# Patient Record
Sex: FEMALE | Race: WHITE | NOT HISPANIC OR LATINO | Employment: FULL TIME | URBAN - METROPOLITAN AREA
[De-identification: names, ages, dates, MRNs, and addresses within clinical notes are randomized per-mention and may not be internally consistent; named-entity substitution may affect disease eponyms.]

---

## 2022-06-23 ENCOUNTER — HOSPITAL ENCOUNTER (EMERGENCY)
Facility: CLINIC | Age: 22
Discharge: HOME | End: 2022-06-23
Attending: EMERGENCY MEDICINE | Admitting: EMERGENCY MEDICINE
Payer: COMMERCIAL

## 2022-06-23 ENCOUNTER — APPOINTMENT (OUTPATIENT)
Dept: ULTRASOUND IMAGING | Facility: CLINIC | Age: 22
End: 2022-06-23
Attending: EMERGENCY MEDICINE
Payer: COMMERCIAL

## 2022-06-23 VITALS
SYSTOLIC BLOOD PRESSURE: 102 MMHG | HEART RATE: 66 BPM | RESPIRATION RATE: 16 BRPM | DIASTOLIC BLOOD PRESSURE: 56 MMHG | OXYGEN SATURATION: 100 % | TEMPERATURE: 98.2 F

## 2022-06-23 DIAGNOSIS — O46.90 VAGINAL BLEEDING IN PREGNANCY: ICD-10-CM

## 2022-06-23 LAB
ABO/RH(D): NORMAL
ANION GAP SERPL CALCULATED.3IONS-SCNC: 5 MMOL/L (ref 3–14)
ANTIBODY SCREEN: NEGATIVE
B-HCG SERPL-ACNC: 18 IU/L (ref 0–5)
BASOPHILS # BLD AUTO: 0.1 10E3/UL (ref 0–0.2)
BASOPHILS NFR BLD AUTO: 1 %
BUN SERPL-MCNC: 12 MG/DL (ref 7–30)
CALCIUM SERPL-MCNC: 9 MG/DL (ref 8.5–10.1)
CHLORIDE BLD-SCNC: 106 MMOL/L (ref 94–109)
CO2 SERPL-SCNC: 28 MMOL/L (ref 20–32)
CREAT SERPL-MCNC: 0.58 MG/DL (ref 0.52–1.04)
EOSINOPHIL # BLD AUTO: 0.2 10E3/UL (ref 0–0.7)
EOSINOPHIL NFR BLD AUTO: 2 %
ERYTHROCYTE [DISTWIDTH] IN BLOOD BY AUTOMATED COUNT: 12.6 % (ref 10–15)
GFR SERPL CREATININE-BSD FRML MDRD: >90 ML/MIN/1.73M2
GLUCOSE BLD-MCNC: 101 MG/DL (ref 70–99)
HCT VFR BLD AUTO: 38.9 % (ref 35–47)
HGB BLD-MCNC: 12.3 G/DL (ref 11.7–15.7)
IMM GRANULOCYTES # BLD: 0 10E3/UL
IMM GRANULOCYTES NFR BLD: 0 %
LYMPHOCYTES # BLD AUTO: 3 10E3/UL (ref 0.8–5.3)
LYMPHOCYTES NFR BLD AUTO: 33 %
MCH RBC QN AUTO: 27.6 PG (ref 26.5–33)
MCHC RBC AUTO-ENTMCNC: 31.6 G/DL (ref 31.5–36.5)
MCV RBC AUTO: 87 FL (ref 78–100)
MONOCYTES # BLD AUTO: 0.8 10E3/UL (ref 0–1.3)
MONOCYTES NFR BLD AUTO: 9 %
NEUTROPHILS # BLD AUTO: 4.9 10E3/UL (ref 1.6–8.3)
NEUTROPHILS NFR BLD AUTO: 55 %
NRBC # BLD AUTO: 0 10E3/UL
NRBC BLD AUTO-RTO: 0 /100
PLATELET # BLD AUTO: 347 10E3/UL (ref 150–450)
POTASSIUM BLD-SCNC: 3.6 MMOL/L (ref 3.4–5.3)
RBC # BLD AUTO: 4.46 10E6/UL (ref 3.8–5.2)
SODIUM SERPL-SCNC: 139 MMOL/L (ref 133–144)
SPECIMEN EXPIRATION DATE: NORMAL
WBC # BLD AUTO: 9 10E3/UL (ref 4–11)

## 2022-06-23 PROCEDURE — 250N000011 HC RX IP 250 OP 636: Performed by: EMERGENCY MEDICINE

## 2022-06-23 PROCEDURE — 86901 BLOOD TYPING SEROLOGIC RH(D): CPT | Performed by: EMERGENCY MEDICINE

## 2022-06-23 PROCEDURE — 96361 HYDRATE IV INFUSION ADD-ON: CPT

## 2022-06-23 PROCEDURE — 82374 ASSAY BLOOD CARBON DIOXIDE: CPT | Performed by: EMERGENCY MEDICINE

## 2022-06-23 PROCEDURE — 96374 THER/PROPH/DIAG INJ IV PUSH: CPT

## 2022-06-23 PROCEDURE — 258N000003 HC RX IP 258 OP 636: Performed by: EMERGENCY MEDICINE

## 2022-06-23 PROCEDURE — 82310 ASSAY OF CALCIUM: CPT | Performed by: EMERGENCY MEDICINE

## 2022-06-23 PROCEDURE — 36415 COLL VENOUS BLD VENIPUNCTURE: CPT | Performed by: EMERGENCY MEDICINE

## 2022-06-23 PROCEDURE — 84702 CHORIONIC GONADOTROPIN TEST: CPT | Performed by: EMERGENCY MEDICINE

## 2022-06-23 PROCEDURE — 99285 EMERGENCY DEPT VISIT HI MDM: CPT | Mod: 25

## 2022-06-23 PROCEDURE — 85025 COMPLETE CBC W/AUTO DIFF WBC: CPT | Performed by: EMERGENCY MEDICINE

## 2022-06-23 PROCEDURE — 76801 OB US < 14 WKS SINGLE FETUS: CPT

## 2022-06-23 PROCEDURE — 86850 RBC ANTIBODY SCREEN: CPT | Performed by: EMERGENCY MEDICINE

## 2022-06-23 RX ORDER — KETOROLAC TROMETHAMINE 15 MG/ML
15 INJECTION, SOLUTION INTRAMUSCULAR; INTRAVENOUS ONCE
Status: COMPLETED | OUTPATIENT
Start: 2022-06-23 | End: 2022-06-23

## 2022-06-23 RX ORDER — ONDANSETRON 4 MG/1
4 TABLET, ORALLY DISINTEGRATING ORAL ONCE
Status: COMPLETED | OUTPATIENT
Start: 2022-06-23 | End: 2022-06-23

## 2022-06-23 RX ADMIN — ONDANSETRON 4 MG: 4 TABLET, ORALLY DISINTEGRATING ORAL at 20:33

## 2022-06-23 RX ADMIN — KETOROLAC TROMETHAMINE 15 MG: 15 INJECTION, SOLUTION INTRAMUSCULAR; INTRAVENOUS at 22:10

## 2022-06-23 RX ADMIN — SODIUM CHLORIDE 1000 ML: 9 INJECTION, SOLUTION INTRAVENOUS at 22:10

## 2022-06-23 ASSESSMENT — ENCOUNTER SYMPTOMS
VOMITING: 0
NAUSEA: 1
ABDOMINAL PAIN: 1

## 2022-06-23 NOTE — LETTER
June 23, 2022      To Whom It May Concern:      Rosanne Raines was seen in our Emergency Department today, 06/23/22. She may return to work/school on 06/25/2022.    Sincerely,        Ryan Wright RN

## 2022-06-24 NOTE — DISCHARGE INSTRUCTIONS
Please follow-up with your primary OB/GYN provider for ongoing monitoring of your symptoms    Return to ER with increased pain, bleeding, vomiting, fevers, chills

## 2022-06-24 NOTE — ED PROVIDER NOTES
History   Chief Complaint:  Vaginal Bleeding - Pregnant     The history is provided by the patient.      Rosanne Raines is a pregnant 22 year old  at approximately 5 wk EGA, who presents with vaginal bleeding.  Patient was initially evaluated at the emergency room on 2022.  She follow-up with OB/GYN on 2022, or quantitative hCG returned at 113.  This was rechecked yesterday and declined to 37.  Patient reports that at her OB/GYN visit, her blood pressure was noted to be low.  Since that time, she notes ongoing vaginal bleeding.  Earlier today, she noted soaking through approximately one half of tampon per hour, prompting concern.  She also notes ongoing abdominal cramping.  Upon arrival, she feels as though her bleeding has subsided/slowed.  She denies any prior history of miscarriage.  Per chart review, she is Rh+.  Denies any other complaints or concerns at this time.    Review of Systems   Gastrointestinal: Positive for abdominal pain and nausea. Negative for vomiting.   Genitourinary: Positive for vaginal bleeding and vaginal pain.   All other systems reviewed and are negative.    Allergies:  The patient has no known allergies.     Medications:  The patient is currently on no regular medications.     Past Medical History:     The patient denies past medical history.      Social History:  The patient presents to the ED alone.  The patient arrived via private car.     Physical Exam     Patient Vitals for the past 24 hrs:   BP Temp Temp src Pulse Resp SpO2   22 2327 102/56 -- -- 66 16 100 %   22 2148 104/79 -- -- -- -- --   22 -- 98.2  F (36.8  C) Oral 74 20 97 %       Physical Exam  General:              Well-nourished              Speaking in full sentences  Eyes:              Conjunctiva without injection or scleral icterus  ENT:              Moist mucous membranes              Nares patent              Pinnae normal  Neck:              Full ROM              No stiffness  appreciated  Resp:              Lungs CTAB              No crackles, wheezing or audible rubs              Good air movement  CV:                    Normal rate, regular rhythm              S1 and S2 present              No murmur, gallop or rub  GI:              BS present              Abdomen soft without distention              Non-tender to light and deep palpation              No guarding or rebound tenderness  Skin:              Warm, dry, well perfused              No rashes or open wounds on exposed skin  MSK:              Moves all extremities              No focal deformities or swelling  Neuro:              Alert              Answers questions appropriately              Moves all extremities equally              Gait stable  Psych:              Normal affect, normal mood    Emergency Department Course   Imaging:  OB  US 1st trimester w transvag   Final Result   IMPRESSION:    1.  Anteverted uterus. No intrauterine or ectopic gestation identified. Normal endometrial stripe. Please correlate clinically.      2.  Both ovaries are obscured due to bowel gas.      3.  No free fluid in the dependent pelvis.              Report per radiology    Laboratory:  Labs Ordered and Resulted from Time of ED Arrival to Time of ED Departure   HCG QUANTITATIVE PREGNANCY - Abnormal       Result Value    hCG Quantitative 18 (*)    BASIC METABOLIC PANEL - Abnormal    Sodium 139      Potassium 3.6      Chloride 106      Carbon Dioxide (CO2) 28      Anion Gap 5      Urea Nitrogen 12      Creatinine 0.58      Calcium 9.0      Glucose 101 (*)     GFR Estimate >90     CBC WITH PLATELETS AND DIFFERENTIAL    WBC Count 9.0      RBC Count 4.46      Hemoglobin 12.3      Hematocrit 38.9      MCV 87      MCH 27.6      MCHC 31.6      RDW 12.6      Platelet Count 347      % Neutrophils 55      % Lymphocytes 33      % Monocytes 9      % Eosinophils 2      % Basophils 1      % Immature Granulocytes 0      NRBCs per 100 WBC 0      Absolute  Neutrophils 4.9      Absolute Lymphocytes 3.0      Absolute Monocytes 0.8      Absolute Eosinophils 0.2      Absolute Basophils 0.1      Absolute Immature Granulocytes 0.0      Absolute NRBCs 0.0     TYPE AND SCREEN, ADULT   ABO/RH TYPE AND SCREEN        Emergency Department Course:     Reviewed:  I reviewed nursing notes, vitals, past medical history and Care Everywhere    Assessments:   I obtained history and examined the patient as noted above.    I rechecked the patient and explained findings.     Consults:   I spoke with the ultrasound service from [facility] regarding patient's presentation, findings, and plan of care.    Interventions:   Zofran 4 mg Oral  0 0.9% sodium chloride BOLUS 1L IV.  Hemoglobin presently normal.  Offered pelvic exam, though given improvements in bleeding, patient declining at this time.   Toradol 15 mg IV    Disposition:  The patient was discharged to home.     Impression & Plan   Medical Decision Making:  Rosanne Raines is a 22 yr old  presenting to the ED for evaluation of vaginal bleeding in the setting of pregnancy.  VS on presentation reveal no acute abnormalities.  Her presenting evaluation is concerning for miscarriage given declining hCG.  Quantitative hCG returned at 18, which has progressively declined from 2 previous values.  Patient is Rh+ and thus does not require RhoGAM.  Pelvic ultrasound performed, which is negative for evidence of IUP, retained products of conception, or clear findings to suggest that ectopic pregnancy.  Specifically, no evidence of free intra-abdominal fluid.  Note made that ovaries are partially obscured by bowel gas, though current presentation also less suggestive of ovarian torsion.  Patient offered pelvic exam, though at this time, bleeding has improved and she declines.  I do feel this to be reasonable as it would unlikely  moving forward.  Discussed ongoing supportive cares including rest, fluids,  Tylenol as needed for analgesia, and follow-up with OB/GYN to ensure hCG level declines to 0.  Return precautions discussed including increased pain, bleeding more than 1 pad per hour, fevers, or any other new or troubling symptoms.  Remainder of abdominal exam is soft and nontender for which I feel concurrent surgical intra-abdominal pathology to be unlikely and further advanced imaging can be deferred.  Patient comfortable with outlined plan of care.  Questions have been answered prior to discharge.    Diagnosis:    ICD-10-CM    1. Vaginal bleeding in pregnancy  O46.90        Scribe Disclosure:  I, Vinicius Castrejon, am serving as a scribe at 9:20 PM on 6/23/2022 to document services personally performed by Randy Luevano MD based on my observations and the provider's statements to me.        Randy Luevano MD  06/24/22 0012

## 2022-12-23 ENCOUNTER — HOSPITAL ENCOUNTER (EMERGENCY)
Facility: CLINIC | Age: 22
Discharge: HOME | End: 2022-12-24
Attending: EMERGENCY MEDICINE | Admitting: EMERGENCY MEDICINE
Payer: COMMERCIAL

## 2022-12-23 DIAGNOSIS — L03.116 CELLULITIS OF LEFT LOWER EXTREMITY: ICD-10-CM

## 2022-12-23 DIAGNOSIS — R11.2 NAUSEA AND VOMITING, UNSPECIFIED VOMITING TYPE: ICD-10-CM

## 2022-12-23 LAB
ALBUMIN UR-MCNC: NEGATIVE MG/DL
ANION GAP SERPL CALCULATED.3IONS-SCNC: 10 MMOL/L (ref 7–15)
APPEARANCE UR: CLEAR
BILIRUB UR QL STRIP: NEGATIVE
BUN SERPL-MCNC: 11 MG/DL (ref 6–20)
CALCIUM SERPL-MCNC: 9.6 MG/DL (ref 8.6–10)
CHLORIDE SERPL-SCNC: 101 MMOL/L (ref 98–107)
COLOR UR AUTO: ABNORMAL
CREAT SERPL-MCNC: 0.6 MG/DL (ref 0.51–0.95)
DEPRECATED HCO3 PLAS-SCNC: 25 MMOL/L (ref 22–29)
ERYTHROCYTE [DISTWIDTH] IN BLOOD BY AUTOMATED COUNT: 12.5 % (ref 10–15)
FLUAV RNA SPEC QL NAA+PROBE: NEGATIVE
FLUBV RNA RESP QL NAA+PROBE: NEGATIVE
GFR SERPL CREATININE-BSD FRML MDRD: >90 ML/MIN/1.73M2
GLUCOSE SERPL-MCNC: 101 MG/DL (ref 70–99)
GLUCOSE UR STRIP-MCNC: NEGATIVE MG/DL
HCG SERPL QL: NEGATIVE
HCT VFR BLD AUTO: 39.9 % (ref 35–47)
HGB BLD-MCNC: 12.9 G/DL (ref 11.7–15.7)
HGB UR QL STRIP: ABNORMAL
HOLD SPECIMEN: NORMAL
KETONES UR STRIP-MCNC: NEGATIVE MG/DL
LACTATE SERPL-SCNC: 1 MMOL/L (ref 0.7–2)
LEUKOCYTE ESTERASE UR QL STRIP: NEGATIVE
MCH RBC QN AUTO: 27.8 PG (ref 26.5–33)
MCHC RBC AUTO-ENTMCNC: 32.3 G/DL (ref 31.5–36.5)
MCV RBC AUTO: 86 FL (ref 78–100)
MUCOUS THREADS #/AREA URNS LPF: PRESENT /LPF
NITRATE UR QL: NEGATIVE
PH UR STRIP: 6.5 [PH] (ref 5–7)
PLATELET # BLD AUTO: 334 10E3/UL (ref 150–450)
POTASSIUM SERPL-SCNC: 4.1 MMOL/L (ref 3.4–5.3)
RBC # BLD AUTO: 4.64 10E6/UL (ref 3.8–5.2)
RBC URINE: 4 /HPF
RSV RNA SPEC NAA+PROBE: NEGATIVE
SARS-COV-2 RNA RESP QL NAA+PROBE: NEGATIVE
SODIUM SERPL-SCNC: 136 MMOL/L (ref 136–145)
SP GR UR STRIP: 1.02 (ref 1–1.03)
SQUAMOUS EPITHELIAL: 2 /HPF
UROBILINOGEN UR STRIP-MCNC: NORMAL MG/DL
WBC # BLD AUTO: 6.4 10E3/UL (ref 4–11)
WBC URINE: 2 /HPF

## 2022-12-23 PROCEDURE — 85027 COMPLETE CBC AUTOMATED: CPT | Performed by: EMERGENCY MEDICINE

## 2022-12-23 PROCEDURE — 96361 HYDRATE IV INFUSION ADD-ON: CPT

## 2022-12-23 PROCEDURE — 96374 THER/PROPH/DIAG INJ IV PUSH: CPT

## 2022-12-23 PROCEDURE — 80048 BASIC METABOLIC PNL TOTAL CA: CPT | Performed by: EMERGENCY MEDICINE

## 2022-12-23 PROCEDURE — 84703 CHORIONIC GONADOTROPIN ASSAY: CPT | Performed by: EMERGENCY MEDICINE

## 2022-12-23 PROCEDURE — 36415 COLL VENOUS BLD VENIPUNCTURE: CPT | Performed by: EMERGENCY MEDICINE

## 2022-12-23 PROCEDURE — C9803 HOPD COVID-19 SPEC COLLECT: HCPCS

## 2022-12-23 PROCEDURE — 87637 SARSCOV2&INF A&B&RSV AMP PRB: CPT | Performed by: EMERGENCY MEDICINE

## 2022-12-23 PROCEDURE — 258N000003 HC RX IP 258 OP 636: Performed by: EMERGENCY MEDICINE

## 2022-12-23 PROCEDURE — 81001 URINALYSIS AUTO W/SCOPE: CPT | Performed by: EMERGENCY MEDICINE

## 2022-12-23 PROCEDURE — 83605 ASSAY OF LACTIC ACID: CPT | Performed by: EMERGENCY MEDICINE

## 2022-12-23 PROCEDURE — 96376 TX/PRO/DX INJ SAME DRUG ADON: CPT

## 2022-12-23 PROCEDURE — 250N000011 HC RX IP 250 OP 636: Performed by: EMERGENCY MEDICINE

## 2022-12-23 PROCEDURE — 99284 EMERGENCY DEPT VISIT MOD MDM: CPT | Mod: CS,25

## 2022-12-23 PROCEDURE — 87040 BLOOD CULTURE FOR BACTERIA: CPT | Performed by: EMERGENCY MEDICINE

## 2022-12-23 RX ORDER — ONDANSETRON 2 MG/ML
4 INJECTION INTRAMUSCULAR; INTRAVENOUS ONCE
Status: COMPLETED | OUTPATIENT
Start: 2022-12-23 | End: 2022-12-23

## 2022-12-23 RX ORDER — METOCLOPRAMIDE HYDROCHLORIDE 5 MG/ML
10 INJECTION INTRAMUSCULAR; INTRAVENOUS ONCE
Status: COMPLETED | OUTPATIENT
Start: 2022-12-24 | End: 2022-12-24

## 2022-12-23 RX ORDER — KETOROLAC TROMETHAMINE 15 MG/ML
10 INJECTION, SOLUTION INTRAMUSCULAR; INTRAVENOUS ONCE
Status: COMPLETED | OUTPATIENT
Start: 2022-12-24 | End: 2022-12-24

## 2022-12-23 RX ADMIN — SODIUM CHLORIDE 1000 ML: 9 INJECTION, SOLUTION INTRAVENOUS at 21:01

## 2022-12-23 RX ADMIN — ONDANSETRON 4 MG: 2 INJECTION INTRAMUSCULAR; INTRAVENOUS at 23:15

## 2022-12-23 RX ADMIN — ONDANSETRON 4 MG: 2 INJECTION INTRAMUSCULAR; INTRAVENOUS at 21:01

## 2022-12-23 ASSESSMENT — ACTIVITIES OF DAILY LIVING (ADL): ADLS_ACUITY_SCORE: 35

## 2022-12-24 VITALS
WEIGHT: 168 LBS | SYSTOLIC BLOOD PRESSURE: 109 MMHG | BODY MASS INDEX: 32.98 KG/M2 | DIASTOLIC BLOOD PRESSURE: 50 MMHG | TEMPERATURE: 98.2 F | OXYGEN SATURATION: 98 % | HEIGHT: 60 IN | RESPIRATION RATE: 16 BRPM | HEART RATE: 70 BPM

## 2022-12-24 PROCEDURE — 250N000011 HC RX IP 250 OP 636: Performed by: EMERGENCY MEDICINE

## 2022-12-24 PROCEDURE — 96375 TX/PRO/DX INJ NEW DRUG ADDON: CPT

## 2022-12-24 PROCEDURE — 258N000003 HC RX IP 258 OP 636: Performed by: EMERGENCY MEDICINE

## 2022-12-24 RX ORDER — METOCLOPRAMIDE 10 MG/1
10 TABLET ORAL 3 TIMES DAILY PRN
Qty: 20 TABLET | Refills: 0 | Status: SHIPPED | OUTPATIENT
Start: 2022-12-24

## 2022-12-24 RX ORDER — SODIUM CHLORIDE 9 MG/ML
INJECTION, SOLUTION INTRAVENOUS CONTINUOUS
Status: DISCONTINUED | OUTPATIENT
Start: 2022-12-24 | End: 2022-12-24 | Stop reason: HOSPADM

## 2022-12-24 RX ORDER — ONDANSETRON 4 MG/1
4 TABLET, ORALLY DISINTEGRATING ORAL EVERY 8 HOURS PRN
Qty: 10 TABLET | Refills: 0 | Status: SHIPPED | OUTPATIENT
Start: 2022-12-24 | End: 2022-12-27

## 2022-12-24 RX ORDER — CEPHALEXIN 500 MG/1
500 CAPSULE ORAL 4 TIMES DAILY
Qty: 28 CAPSULE | Refills: 0 | Status: SHIPPED | OUTPATIENT
Start: 2022-12-24 | End: 2022-12-31

## 2022-12-24 RX ADMIN — SODIUM CHLORIDE 1000 ML: 9 INJECTION, SOLUTION INTRAVENOUS at 00:38

## 2022-12-24 RX ADMIN — KETOROLAC TROMETHAMINE 10 MG: 15 INJECTION, SOLUTION INTRAMUSCULAR; INTRAVENOUS at 00:37

## 2022-12-24 RX ADMIN — METOCLOPRAMIDE HYDROCHLORIDE 10 MG: 5 INJECTION INTRAMUSCULAR; INTRAVENOUS at 00:37

## 2022-12-24 NOTE — DISCHARGE INSTRUCTIONS
Please keep hydrated with lots of fluids.  May use zofran and or reglan as needed for nausea and vomiting.  If you are unable to keep fluids down, unable to urinate, lightheaded or dizzy, develop abdominal pain, fevers not responsive to tylenol/ibuprofen or other acute changes return to the ED.  Otherwise please follow up with your PCP in 2-3 days for a recheck.      Discharge Instructions  Vomiting    You have been seen today for vomiting (throwing up). This is usually caused by a virus, but some bacteria, parasites, medicines or other medical conditions can cause similar symptoms. At this time your provider does not find that your vomiting is a sign of anything dangerous or life-threatening. However, sometimes the signs of serious illness do not show up right away. If you have new or worse symptoms, you may need to be seen again in the Emergency Department or by your primary provider. Remember that serious problems like appendicitis can start as vomiting.    Generally, every Emergency Department visit should have a follow-up clinic visit with either a primary or a specialty clinic/provider. Please follow-up as instructed by your emergency provider today.    Return to the Emergency Department if:  You keep vomiting and you are not able to keep liquids down.   You feel you are getting dehydrated, such as being very thirsty, not urinating (peeing) at least every 8-12 hours, or feeling faint or lightheaded.   You develop a new fever, or your fever continues for more than 2 days.   You have abdominal (belly pain) that seems worse than cramps, is in one spot, or is getting worse over time. Appendicitis usually causes pain in the right lower abdomen (to the right and below your belly button) so watch for pain in this location.  You have blood in your vomit or stools.   You feel very weak.  You are not starting to improve within 24 hours of your visit here.     What can I do to help myself?  The most important thing to do  is to drink clear liquids. If you have been vomiting a lot, it is best to have only small, frequent sips of liquids. Drinking too much at once may cause more vomiting. If you are vomiting often, you must replace minerals, sodium and potassium lost with your illness. Pedialyte  is the best available rehydration liquid but some find that it doesn t taste good so sports drinks are an alterative. You can also drink clear liquids such as water, weak tea, apple juice, and 7-Up . Avoid acid liquids (orange), caffeine (coffee) or alcohol. Do not drink milk until you no longer have diarrhea (loose stools).   After liquids are staying down, you may start eating mild foods. Soda crackers, toast, plain noodles, gelatin, applesauce and bananas are good first choices. Avoid foods that have acid, are spicy, fatty or have a lot of fiber (such as meats, coarse grains, vegetables). You may start eating these foods again in about 3 days when you are better.   Sometimes treatment includes prescription medicine to prevent nausea (sick to your stomach) and vomiting. If your provider prescribes these for you, take them as directed.   Do not take ibuprofen, naproxen, or other nonsteroidal anti-inflammatory (NSAID) medicines without checking with your healthcare provider.     If you were given a prescription for medicine here today, be sure to read all of the information (including the package insert) that comes with your prescription.  This will include important information about the medicine, its side effects, and any warnings that you need to know about.  The pharmacist who fills the prescription can provide more information and answer questions you may have about the medicine.  If you have questions or concerns that the pharmacist cannot address, please call or return to the Emergency Department.     Remember that you can always come back to the Emergency Department if you are not able to see your regular provider in the amount of time  listed above, if you get any new symptoms, or if there is anything that worries you.    Discharge Instructions  Cellulitis    Cellulitis is an infection of the skin that occurs when bacteria enter the skin.   Symptoms are generally redness, swelling, warmth and pain.  Your infection appeared to be appropriate to treat at home with antibiotics.  However, sometimes your infection may be worse than it seemed at first, or may worsen with time. If you have new or worse symptoms, you may need to be seen again in the Emergency Department or by your primary provider.    Generally, every Emergency Department visit should have a follow-up clinic visit with either a primary or a specialty clinic/provider. Please follow-up as instructed by your emergency provider today.    Return to the Emergency Department if:  The redness, pain, or swelling gets a lot worse.  If the red area was marked, return if it is red significantly beyond the marked area.  You are unable to get your antibiotics, or are vomiting (throwing up) these pills, or you cannot take them.  You are feeling more ill, weak or lightheaded.  You start to run a new fever (temperature >101 F).  Anything else about the infection worries or concerns you.  Treatment:  Start your antibiotics right away, and take them as prescribed. Be sure to finish the whole prescription, even if you are better.  Apply a heating pad, warm packs, or warm water soaks to the infected area for 15 minutes at a time, at least 3 times a day. Do not use a heating pad on your feet or legs if you have diabetes. Do not sleep with a heating pad on, since this can cause burns or skin injury.  Rest your injured area for at least 1-2 days. After that you may start using your extremity again as long as there is not too much pain.   Raise the injured area above the level of your heart as much as possible in the first 1-2 days.  Tylenol  (acetaminophen), Motrin  (ibuprofen), or Advil  (ibuprofen) may help  may help reduce pain and fever and may help you feel more comfortable. Be sure to read and follow the package directions, and ask your provider if you have questions.    If you were given a prescription for medicine here today, be sure to read all of the information (including the package insert) that comes with your prescription.  This will include important information about the medicine, its side effects, and any warnings that you need to know about.  The pharmacist who fills the prescription can provide more information and answer questions you may have about the medicine.  If you have questions or concerns that the pharmacist cannot address, please call or return to the Emergency Department.     Remember that you can always come back to the Emergency Department if you are not able to see your regular provider in the amount of time listed above, if you get any new symptoms, or if there is anything that worries you.

## 2022-12-24 NOTE — ED PROVIDER NOTES
History     Chief Complaint:  Fever       HPI   Rosanne Raines is a 22 year old female who presents with fever, aches, left foot pain.  Patient reports that she started to feel sick and was seen at the [place] ER on Tuesday.  She was given IV fluids and Rocephin.  She has had difficulty with nausea and vomiting since then.  They gave her the swallow Zofran not the ODT which she is found hard to tolerate.  She does not have any diarrhea, dysuria, frequency, abdominal pain, cough, runny nose, sore throat.  She notes that she started having the wound on the medial aspect of her left foot which started off as a blister but became more red, irritated and burning.  She returns the ER as she is having difficulty tolerating p.o.  She has not been able to tolerate her antibiotics.  She also continues have fevers.  Denies street drug use.      CXR 12/20/22 IMPRESSION:   The lungs are clear. No focal consolidation. No pneumothorax or pleural effusion. Normal   cardiac size.      ROS:  Review of Systems   All other systems reviewed and are negative.    Allergies:  No Known Allergies     Medications:    cephALEXin (KEFLEX) 500 MG capsule  metoclopramide (REGLAN) 10 MG tablet  ondansetron (ZOFRAN ODT) 4 MG ODT tab        Past Medical History:    No past medical history on file.    Past Surgical History:    No past surgical history on file.     Family History:    family history is not on file.    Social History:     PCP: No Ref-Primary, Physician     Physical Exam     Patient Vitals for the past 24 hrs:   BP Temp Temp src Pulse Resp SpO2 Height Weight   12/24/22 0045 109/50 -- -- 70 16 98 % -- --   12/23/22 2056 133/85 98.2  F (36.8  C) Oral 100 18 98 % 1.524 m (5') 76.2 kg (168 lb)        Physical Exam  General: Resting on the bed.  Head: No obvious trauma to head.  Ears, Nose, Throat:  External ears normal.  Nose normal.  No pharyngeal erythema, swelling or exudate.  Midline uvula.    Eyes:  Conjunctivae clear.  Pupils are  equal, round, and reactive.   Neck: Normal range of motion.  Neck supple.   CV: Regular rate and rhythm.  No murmurs.      Respiratory: Effort normal and breath sounds normal.  No wheezing or crackles.   Gastrointestinal: Soft.  No distension. There is no tenderness.    Musculoskeletal: Able to wiggle toes.  Sensation intact light touch.  Cap refill normal.  Normal range of motion of the MTP.  Neuro: Alert. Moving all extremities appropriately.  Normal speech.    Skin: Skin is warm and dry.  Erythema and tenderness noted on the medial aspect of the great toe MTP.  Emergency Department Course     Laboratory:  Labs Ordered and Resulted from Time of ED Arrival to Time of ED Departure   BASIC METABOLIC PANEL - Abnormal       Result Value    Sodium 136      Potassium 4.1      Chloride 101      Carbon Dioxide (CO2) 25      Anion Gap 10      Urea Nitrogen 11.0      Creatinine 0.60      Calcium 9.6      Glucose 101 (*)     GFR Estimate >90     ROUTINE UA WITH MICROSCOPIC REFLEX TO CULTURE - Abnormal    Color Urine Light Yellow      Appearance Urine Clear      Glucose Urine Negative      Bilirubin Urine Negative      Ketones Urine Negative      Specific Gravity Urine 1.021      Blood Urine Trace (*)     pH Urine 6.5      Protein Albumin Urine Negative      Urobilinogen Urine Normal      Nitrite Urine Negative      Leukocyte Esterase Urine Negative      Mucus Urine Present (*)     RBC Urine 4 (*)     WBC Urine 2      Squamous Epithelials Urine 2 (*)    CBC WITH PLATELETS - Normal    WBC Count 6.4      RBC Count 4.64      Hemoglobin 12.9      Hematocrit 39.9      MCV 86      MCH 27.8      MCHC 32.3      RDW 12.5      Platelet Count 334     HCG QUALITATIVE PREGNANCY - Normal    hCG Serum Qualitative Negative     LACTIC ACID WHOLE BLOOD - Normal    Lactic Acid 1.0     INFLUENZA A/B & SARS-COV2 PCR MULTIPLEX - Normal    Influenza A PCR Negative      Influenza B PCR Negative      RSV PCR Negative      SARS CoV2 PCR Negative      BLOOD CULTURE   BLOOD CULTURE        Procedures       Emergency Department Course:             Reviewed:  I reviewed nursing notes, vitals, past medical history and Care Everywhere    Assessments:   I obtained history and examined the patient as noted above.    I rechecked the patient and explained findings.   100 I rechecked the patient, tolerating PO     Interventions:  Medications   0.9% sodium chloride BOLUS (0 mLs Intravenous Stopped 22)     Followed by   sodium chloride 0.9% infusion (has no administration in time range)   ondansetron (ZOFRAN) injection 4 mg (4 mg Intravenous Given 22)   0.9% sodium chloride BOLUS (0 mLs Intravenous Stopped 22)   ondansetron (ZOFRAN) injection 4 mg (4 mg Intravenous Given 22)   metoclopramide (REGLAN) injection 10 mg (10 mg Intravenous Given 22)   ketorolac (TORADOL) injection 10 mg (10 mg Intravenous Given 22)        Disposition:  The patient was discharged to home.     Impression & Plan      Medical Decision Makin-year-old female otherwise healthy presents with a concern for wound infection.  Vitals are reassuring.  Broad differentials pursued include not limited to severe sepsis, septic shock, dehydration, electrolyte, metabolic, renal dysfunction, influenza, COVID-19, dehydration, obstruction, perforation, UTI, etc.  Patient is well-appearing nontoxic.  CBC without leukocytosis or anemia.  BMP without acute electrolyte, metabolic or renal dysfunction.  Lactate is normal not concerning for severe sepsis or septic shock.  COVID-19, influenza, RSV all negative.  Pregnancy test negative.  UA unrevealing no signs of obvious infection.  She recent chest x-ray that was negative for any infection.  She is a very mild cellulitis on the medial aspect of her left foot.  She is able to range the toe normally.  I do not suspect septic joint.  No obvious abscess or area requiring imaging at this time.   Blood cultures obtained and pending prior to my assessment.  Patient has a benign abdomen.  No evidence of acute surgical process.  Do not think imaging is appropriate.  She is given Zofran and Compazine along with fluids.  She was feeling better.  She was able to p.o. challenge.  On reassessment she had gotten a phone call that her child was also vomiting at home.  I suspect likely a stomach bug and possible cellulitis as well.  Recommend supportive care.  Close follow-up with primary doctor.  Return precautions were given and patient was wishing to go home feeling improved.    Diagnosis:    ICD-10-CM    1. Cellulitis of left lower extremity  L03.116       2. Nausea and vomiting, unspecified vomiting type  R11.2            Discharge Medications:  Discharge Medication List as of 12/24/2022  1:01 AM      START taking these medications    Details   cephALEXin (KEFLEX) 500 MG capsule Take 1 capsule (500 mg) by mouth 4 times daily for 7 days, Disp-28 capsule, R-0, E-Prescribe      metoclopramide (REGLAN) 10 MG tablet Take 1 tablet (10 mg) by mouth 3 times daily as needed (Nausea or Vomiting), Disp-20 tablet, R-0, E-Prescribe      ondansetron (ZOFRAN ODT) 4 MG ODT tab Take 1 tablet (4 mg) by mouth every 8 hours as needed for nausea, Disp-10 tablet, R-0, E-Prescribe              12/23/2022   Jessica Bennett MD Bennett, Jennifer L, MD  12/24/22 0114

## 2022-12-24 NOTE — ED TRIAGE NOTES
In [facility] for left foot cellulitis. Sent home on bactrim BID. Now pt having entire body aches, continued vomiting (unable to water and abx down). Redness outlined in triage. Temp 101.3 1hr PTA - 500mg tylenol taken at that time.

## 2022-12-24 NOTE — ED NOTES
"Regarding pt's PO challenge with water, pt states \"I have to force myself to take sips. I feel like I'm going to throw up.\" MD notified.  "

## 2022-12-29 LAB
BACTERIA BLD CULT: NO GROWTH
BACTERIA BLD CULT: NO GROWTH

## 2023-02-12 ENCOUNTER — HEALTH MAINTENANCE LETTER (OUTPATIENT)
Age: 23
End: 2023-02-12

## 2023-04-08 ENCOUNTER — APPOINTMENT (OUTPATIENT)
Dept: CT IMAGING | Facility: CLINIC | Age: 23
End: 2023-04-08
Attending: EMERGENCY MEDICINE
Payer: COMMERCIAL

## 2023-04-08 ENCOUNTER — HOSPITAL ENCOUNTER (EMERGENCY)
Facility: CLINIC | Age: 23
Discharge: HOME | End: 2023-04-09
Attending: EMERGENCY MEDICINE | Admitting: EMERGENCY MEDICINE
Payer: COMMERCIAL

## 2023-04-08 DIAGNOSIS — L03.818 CELLULITIS OF OTHER SPECIFIED SITE: ICD-10-CM

## 2023-04-08 DIAGNOSIS — R60.0 FACIAL EDEMA: ICD-10-CM

## 2023-04-08 LAB
ANION GAP SERPL CALCULATED.3IONS-SCNC: 10 MMOL/L (ref 7–15)
BASOPHILS # BLD AUTO: 0 10E3/UL (ref 0–0.2)
BASOPHILS NFR BLD AUTO: 0 %
BUN SERPL-MCNC: 7.6 MG/DL (ref 6–20)
CALCIUM SERPL-MCNC: 9.2 MG/DL (ref 8.6–10)
CHLORIDE SERPL-SCNC: 102 MMOL/L (ref 98–107)
CREAT SERPL-MCNC: 0.62 MG/DL (ref 0.51–0.95)
DEPRECATED HCO3 PLAS-SCNC: 27 MMOL/L (ref 22–29)
EOSINOPHIL # BLD AUTO: 0.1 10E3/UL (ref 0–0.7)
EOSINOPHIL NFR BLD AUTO: 1 %
ERYTHROCYTE [DISTWIDTH] IN BLOOD BY AUTOMATED COUNT: 12.5 % (ref 10–15)
GFR SERPL CREATININE-BSD FRML MDRD: >90 ML/MIN/1.73M2
GLUCOSE SERPL-MCNC: 98 MG/DL (ref 70–99)
HCG INTACT+B SERPL-ACNC: <1 MIU/ML
HCT VFR BLD AUTO: 37.1 % (ref 35–47)
HGB BLD-MCNC: 12.2 G/DL (ref 11.7–15.7)
IMM GRANULOCYTES # BLD: 0 10E3/UL
IMM GRANULOCYTES NFR BLD: 0 %
LACTATE SERPL-SCNC: 1.2 MMOL/L (ref 0.7–2)
LYMPHOCYTES # BLD AUTO: 2.8 10E3/UL (ref 0.8–5.3)
LYMPHOCYTES NFR BLD AUTO: 30 %
MCH RBC QN AUTO: 27.9 PG (ref 26.5–33)
MCHC RBC AUTO-ENTMCNC: 32.9 G/DL (ref 31.5–36.5)
MCV RBC AUTO: 85 FL (ref 78–100)
MONOCYTES # BLD AUTO: 0.9 10E3/UL (ref 0–1.3)
MONOCYTES NFR BLD AUTO: 10 %
NEUTROPHILS # BLD AUTO: 5.6 10E3/UL (ref 1.6–8.3)
NEUTROPHILS NFR BLD AUTO: 59 %
NRBC # BLD AUTO: 0 10E3/UL
NRBC BLD AUTO-RTO: 0 /100
PLATELET # BLD AUTO: 378 10E3/UL (ref 150–450)
POTASSIUM SERPL-SCNC: 4 MMOL/L (ref 3.4–5.3)
RBC # BLD AUTO: 4.38 10E6/UL (ref 3.8–5.2)
SODIUM SERPL-SCNC: 139 MMOL/L (ref 136–145)
WBC # BLD AUTO: 9.4 10E3/UL (ref 4–11)

## 2023-04-08 PROCEDURE — 96376 TX/PRO/DX INJ SAME DRUG ADON: CPT | Mod: 59

## 2023-04-08 PROCEDURE — 85004 AUTOMATED DIFF WBC COUNT: CPT | Performed by: EMERGENCY MEDICINE

## 2023-04-08 PROCEDURE — 99285 EMERGENCY DEPT VISIT HI MDM: CPT | Mod: 25

## 2023-04-08 PROCEDURE — 70487 CT MAXILLOFACIAL W/DYE: CPT

## 2023-04-08 PROCEDURE — 84702 CHORIONIC GONADOTROPIN TEST: CPT | Performed by: EMERGENCY MEDICINE

## 2023-04-08 PROCEDURE — 83605 ASSAY OF LACTIC ACID: CPT | Performed by: EMERGENCY MEDICINE

## 2023-04-08 PROCEDURE — 250N000013 HC RX MED GY IP 250 OP 250 PS 637: Performed by: EMERGENCY MEDICINE

## 2023-04-08 PROCEDURE — 250N000009 HC RX 250: Performed by: EMERGENCY MEDICINE

## 2023-04-08 PROCEDURE — 80048 BASIC METABOLIC PNL TOTAL CA: CPT | Performed by: EMERGENCY MEDICINE

## 2023-04-08 PROCEDURE — 250N000011 HC RX IP 250 OP 636: Performed by: EMERGENCY MEDICINE

## 2023-04-08 PROCEDURE — 36415 COLL VENOUS BLD VENIPUNCTURE: CPT | Performed by: EMERGENCY MEDICINE

## 2023-04-08 PROCEDURE — 96375 TX/PRO/DX INJ NEW DRUG ADDON: CPT

## 2023-04-08 PROCEDURE — 96365 THER/PROPH/DIAG IV INF INIT: CPT

## 2023-04-08 RX ORDER — MORPHINE SULFATE 4 MG/ML
4 INJECTION, SOLUTION INTRAMUSCULAR; INTRAVENOUS ONCE
Status: COMPLETED | OUTPATIENT
Start: 2023-04-08 | End: 2023-04-08

## 2023-04-08 RX ORDER — ONDANSETRON 2 MG/ML
4 INJECTION INTRAMUSCULAR; INTRAVENOUS ONCE
Status: COMPLETED | OUTPATIENT
Start: 2023-04-08 | End: 2023-04-08

## 2023-04-08 RX ORDER — IOPAMIDOL 755 MG/ML
75 INJECTION, SOLUTION INTRAVASCULAR ONCE
Status: COMPLETED | OUTPATIENT
Start: 2023-04-08 | End: 2023-04-08

## 2023-04-08 RX ORDER — ACETAMINOPHEN 500 MG
1000 TABLET ORAL ONCE
Status: COMPLETED | OUTPATIENT
Start: 2023-04-08 | End: 2023-04-08

## 2023-04-08 RX ORDER — ONDANSETRON 4 MG/1
4 TABLET, ORALLY DISINTEGRATING ORAL EVERY 8 HOURS PRN
Qty: 10 TABLET | Refills: 0 | Status: SHIPPED | OUTPATIENT
Start: 2023-04-08 | End: 2023-04-11

## 2023-04-08 RX ORDER — KETOROLAC TROMETHAMINE 15 MG/ML
15 INJECTION, SOLUTION INTRAMUSCULAR; INTRAVENOUS ONCE
Status: COMPLETED | OUTPATIENT
Start: 2023-04-08 | End: 2023-04-08

## 2023-04-08 RX ORDER — CEFTRIAXONE 2 G/1
2 INJECTION, POWDER, FOR SOLUTION INTRAMUSCULAR; INTRAVENOUS ONCE
Status: COMPLETED | OUTPATIENT
Start: 2023-04-08 | End: 2023-04-09

## 2023-04-08 RX ADMIN — MORPHINE SULFATE 4 MG: 4 INJECTION, SOLUTION INTRAMUSCULAR; INTRAVENOUS at 21:17

## 2023-04-08 RX ADMIN — PROCHLORPERAZINE EDISYLATE 5 MG: 5 INJECTION INTRAMUSCULAR; INTRAVENOUS at 23:56

## 2023-04-08 RX ADMIN — IOPAMIDOL 75 ML: 755 INJECTION, SOLUTION INTRAVENOUS at 22:14

## 2023-04-08 RX ADMIN — ONDANSETRON 4 MG: 2 INJECTION INTRAMUSCULAR; INTRAVENOUS at 20:41

## 2023-04-08 RX ADMIN — KETOROLAC TROMETHAMINE 15 MG: 15 INJECTION, SOLUTION INTRAMUSCULAR; INTRAVENOUS at 20:42

## 2023-04-08 RX ADMIN — CEFTRIAXONE SODIUM 2 G: 2 INJECTION, POWDER, FOR SOLUTION INTRAMUSCULAR; INTRAVENOUS at 23:50

## 2023-04-08 RX ADMIN — ONDANSETRON 4 MG: 2 INJECTION INTRAMUSCULAR; INTRAVENOUS at 22:21

## 2023-04-08 RX ADMIN — SODIUM CHLORIDE 60 ML: 9 INJECTION, SOLUTION INTRAVENOUS at 22:15

## 2023-04-08 ASSESSMENT — ACTIVITIES OF DAILY LIVING (ADL)
ADLS_ACUITY_SCORE: 35
ADLS_ACUITY_SCORE: 35

## 2023-04-09 VITALS
HEART RATE: 85 BPM | RESPIRATION RATE: 18 BRPM | BODY MASS INDEX: 33.38 KG/M2 | HEIGHT: 60 IN | DIASTOLIC BLOOD PRESSURE: 56 MMHG | TEMPERATURE: 98.7 F | WEIGHT: 170 LBS | OXYGEN SATURATION: 99 % | SYSTOLIC BLOOD PRESSURE: 101 MMHG

## 2023-04-09 RX ORDER — PROCHLORPERAZINE MALEATE 10 MG
10 TABLET ORAL EVERY 6 HOURS PRN
Qty: 12 TABLET | Refills: 0 | Status: SHIPPED | OUTPATIENT
Start: 2023-04-09

## 2023-04-09 NOTE — ED PROVIDER NOTES
History     Chief Complaint:  Wound Infection (Pt being treated for a facial abscess for last 4 days and pain and swelling getting worse and having fevers.)       BESSIE Raines is a 23 year old female otherwise healthy presenting to the emergency room with left cheek swelling.  The patient went to urgent care on 4/7 for left cheek swelling.  It him going on for 2 days and she had just seen her dentist.  The dentist said that she had cavities on that side but her teeth were not sensitive to chew or to temperature.  The dentist did place her on amoxicillin.  At urgent care they changed her over to Augmentin.  She started vomiting today and she states her pain is no worse the Toradol that they gave her helped a little bit but briefly.  She states she has had a fever today.  Otherwise no cough urinary symptoms..      Independent Historian:   None - Patient Only    Review of External Notes:   4/7/23 Dr Dudley [facility] patient had been seen that day for 2 days of left-sided cheek swelling.  She is seen her dentist which shoulder she has cavities and placed on amoxicillin.  The patient felt that her swelling was getting worse thus presented to urgent care they started her on max Augmentin.    ROS:  Review of Systems    Allergies:  No Known Allergies     Medications:    ondansetron (ZOFRAN ODT) 4 MG ODT tab  metoclopramide (REGLAN) 10 MG tablet        Past Medical History:    No past medical history on file.    Past Surgical History:    No past surgical history on file.     Family History:    family history is not on file.    Social History:     PCP: No Ref-Primary, Physician     Physical Exam     Patient Vitals for the past 24 hrs:   BP Temp Temp src Pulse Resp SpO2 Height Weight   04/08/23 2131 -- -- -- -- -- 97 % -- --   04/08/23 2117 -- -- -- -- -- 98 % -- --   04/08/23 1958 123/61 -- -- -- -- -- -- --   04/08/23 1957 (!) 123/1 98.7  F (37.1  C) Oral 85 18 98 % -- --   04/08/23 1956 -- -- -- -- -- --  1.524 m (5') 77.1 kg (170 lb)        Physical Exam    Physical Exam   Constitutional:  Patient is oriented to person, place, and time. They appear well-developed and well-nourished. Mild distress secondary to left cheek inflammation  HENT:   Mouth/Throat:   Oropharynx is clear and moist.  She does have some dental decay on the left upper lateral incisors.  She has a small soft tissue swelling above this tooth.  No purulence was expressed. Patient's left cheek over this area has a firmness to palpation and is tender.  There is no evidence of facial cellulitis.  Dentition appears to be normal.  No bogginess under the chin or submandibular swelling.  Eyes:    Conjunctivae normal and EOM are normal. Pupils are equal, round, and reactive to light.   Neck:    Normal range of motion.   Cardiovascular: Normal rate, regular rhythm and normal heart sounds.  Exam reveals no gallop and no friction rub.  No murmur heard.  Pulmonary/Chest:  Effort normal and breath sounds normal. Patient has no wheezes. Patient has no rales. rding.   Musculoskeletal:  Normal range of motion. Patient exhibits no edema.   Neurological:   Patient is alert and oriented to person, place, and time. Patient has normal strength. No cranial nerve deficit or sensory deficit. GCS 15  Skin:   Skin is warm and dry. No rash noted. No erythema.   Psychiatric:   Patient has a normal mood and affect. Patient's behavior is normal. Judgment and thought content normal.         Emergency Department Course     Imaging:  CT Facial Bones with Contrast   Final Result   IMPRESSION:    1.  Mild subcutaneous soft tissue swelling left maxillary region without abscess.         Report per radiology    Laboratory:  Labs Ordered and Resulted from Time of ED Arrival to Time of ED Departure   BASIC METABOLIC PANEL - Normal       Result Value    Sodium 139      Potassium 4.0      Chloride 102      Carbon Dioxide (CO2) 27      Anion Gap 10      Urea Nitrogen 7.6      Creatinine 0.62       Calcium 9.2      Glucose 98      GFR Estimate >90     LACTIC ACID WHOLE BLOOD - Normal    Lactic Acid 1.2     HCG QUANTITATIVE PREGNANCY - Normal    hCG Quantitative <1     CBC WITH PLATELETS AND DIFFERENTIAL    WBC Count 9.4      RBC Count 4.38      Hemoglobin 12.2      Hematocrit 37.1      MCV 85      MCH 27.9      MCHC 32.9      RDW 12.5      Platelet Count 378      % Neutrophils 59      % Lymphocytes 30      % Monocytes 10      % Eosinophils 1      % Basophils 0      % Immature Granulocytes 0      NRBCs per 100 WBC 0      Absolute Neutrophils 5.6      Absolute Lymphocytes 2.8      Absolute Monocytes 0.9      Absolute Eosinophils 0.1      Absolute Basophils 0.0      Absolute Immature Granulocytes 0.0      Absolute NRBCs 0.0          Procedures   None    Emergency Department Course & Assessments:             Interventions:  Medications   cefTRIAXone (ROCEPHIN) 2 g vial to attach to  ml bag for ADULTS or NS 50 ml bag for PEDS (2 g Intravenous $New Bag 4/8/23 2350)   ketorolac (TORADOL) injection 15 mg (15 mg Intravenous $Given 4/8/23 2042)   ondansetron (ZOFRAN) injection 4 mg (4 mg Intravenous $Given 4/8/23 2041)   acetaminophen (TYLENOL) tablet 1,000 mg (1,000 mg Oral Not Given 4/8/23 2042)   morphine (PF) injection 4 mg (4 mg Intravenous $Given 4/8/23 2117)   iopamidol (ISOVUE-370) solution 75 mL (75 mLs Intravenous $Given 4/8/23 2214)   Saline Flush (60 mLs Intravenous $Given 4/8/23 2215)   ondansetron (ZOFRAN) injection 4 mg (4 mg Intravenous $Given 4/8/23 2221)   prochlorperazine (COMPAZINE) injection 5 mg (5 mg Intravenous $Given 4/8/23 2356)        Assessments:  I assessed this patient performed history and physical     Independent Interpretation (X-rays, CTs, rhythm strip):  I reviewed the images independently agree with the interpretation  Consultations/Discussion of Management or Tests:  None        Social Determinants of Health affecting care:   None    Disposition:  The patient was  discharged to home.     Impression & Plan    CMS Diagnoses: None    Medical Decision Making:  Rosanne Raines is a 23-year-old female coming in with swelling on her the left side of her cheek.  She also has nausea which is most likely secondary to the antibiotics she was recently prescribed.  On her physical exam she did have a firmness on the left cheek that was tender.  She had of small swelling just above the left lateral incisor that was also tender.  We did discuss doing an aspiration however this area is very small and trying to express purulent fluid showed no fluid.  The patient declined having the aspiration done.  Basic blood work was performed.  Her lactic acid and white blood cell count are normal.  A CT of the maxillofacial area does show facial edema but no evidence of abscess.  There is no erythema over this inflamed area.  Suspect that she most likely has an infection from the lateral upper incisor.  I did give her dose of antibiotics here.  I do feel that the Augmentin given the findings on the CT are appropriate.  We did discuss the side effects of this antibiotic causing nausea vomiting and diarrhea.  She did receive antiemetics here.  She felt that the Compazine worked better so I did prescribe this for her.  She will follow-up closely with her primary care doctor.    Diagnosis:    ICD-10-CM    1. Facial edema  R60.0       2. Cellulitis of other specified site  L03.818            Discharge Medications:  New Prescriptions    ONDANSETRON (ZOFRAN ODT) 4 MG ODT TAB    Take 1 tablet (4 mg) by mouth every 8 hours as needed for nausea             Doris Felipe MD  04/09/23 0020

## 2023-08-30 ENCOUNTER — OFFICE VISIT (OUTPATIENT)
Dept: URGENT CARE | Facility: URGENT CARE | Age: 23
End: 2023-08-30
Payer: COMMERCIAL

## 2023-08-30 VITALS
HEART RATE: 58 BPM | SYSTOLIC BLOOD PRESSURE: 108 MMHG | DIASTOLIC BLOOD PRESSURE: 60 MMHG | TEMPERATURE: 97.5 F | OXYGEN SATURATION: 100 % | RESPIRATION RATE: 16 BRPM

## 2023-08-30 DIAGNOSIS — N76.0 BV (BACTERIAL VAGINOSIS): Primary | ICD-10-CM

## 2023-08-30 DIAGNOSIS — R30.0 DYSURIA: ICD-10-CM

## 2023-08-30 DIAGNOSIS — B96.89 BV (BACTERIAL VAGINOSIS): Primary | ICD-10-CM

## 2023-08-30 LAB
ALBUMIN UR-MCNC: NEGATIVE MG/DL
APPEARANCE UR: CLEAR
BACTERIA #/AREA URNS HPF: ABNORMAL /HPF
BILIRUB UR QL STRIP: NEGATIVE
CLUE CELLS: PRESENT
COLOR UR AUTO: YELLOW
GLUCOSE UR STRIP-MCNC: NEGATIVE MG/DL
HGB UR QL STRIP: ABNORMAL
KETONES UR STRIP-MCNC: NEGATIVE MG/DL
LEUKOCYTE ESTERASE UR QL STRIP: NEGATIVE
NITRATE UR QL: NEGATIVE
PH UR STRIP: 5.5 [PH] (ref 5–7)
RBC #/AREA URNS AUTO: ABNORMAL /HPF
SP GR UR STRIP: >=1.03 (ref 1–1.03)
SQUAMOUS #/AREA URNS AUTO: ABNORMAL /LPF
TRICHOMONAS, WET PREP: ABNORMAL
UROBILINOGEN UR STRIP-ACNC: 0.2 E.U./DL
WBC #/AREA URNS AUTO: ABNORMAL /HPF
WBC'S/HIGH POWER FIELD, WET PREP: ABNORMAL
YEAST, WET PREP: ABNORMAL

## 2023-08-30 PROCEDURE — 81001 URINALYSIS AUTO W/SCOPE: CPT | Performed by: FAMILY MEDICINE

## 2023-08-30 PROCEDURE — 99203 OFFICE O/P NEW LOW 30 MIN: CPT | Performed by: FAMILY MEDICINE

## 2023-08-30 PROCEDURE — 87210 SMEAR WET MOUNT SALINE/INK: CPT | Performed by: FAMILY MEDICINE

## 2023-08-30 RX ORDER — METRONIDAZOLE 500 MG/1
500 TABLET ORAL 2 TIMES DAILY
Qty: 14 TABLET | Refills: 0 | Status: SHIPPED | OUTPATIENT
Start: 2023-08-30 | End: 2023-09-06

## 2023-08-30 NOTE — PROGRESS NOTES
ICD-10-CM    1. BV (bacterial vaginosis)  N76.0 metroNIDAZOLE (FLAGYL) 500 MG tablet    B96.89       2. Dysuria  R30.0 UA Macroscopic with reflex to Microscopic and Culture - Lab Collect     Wet prep - lab collect     UA Macroscopic with reflex to Microscopic and Culture - Lab Collect     Wet prep - lab collect     UA Microscopic with Reflex to Culture        Clue cells noted on wet prep.  UA not suggestive of UTI.    PLAN:  Patient Instructions   Metronidazole twice daily for 7 days to treat bacterial vaginosis.    SUBJECTIVE:  Rosanne Raines is a 23 year old female who presents to  today with about 5 days of burning with urination, vaginal itching, and vaginal discharge.  She has tried OTC intravaginal yeast treatment without any improvement in symptoms.      OBJECTIVE:  /60 (BP Location: Right arm, Patient Position: Sitting, Cuff Size: Adult Regular)   Pulse 58   Temp 97.5  F (36.4  C) (Tympanic)   Resp 16   SpO2 100%   GEN: well-appearing, in NAD    Results for orders placed or performed in visit on 08/30/23   UA Macroscopic with reflex to Microscopic and Culture - Lab Collect     Status: Abnormal    Specimen: Urine, Clean Catch   Result Value Ref Range    Color Urine Yellow Colorless, Straw, Light Yellow, Yellow    Appearance Urine Clear Clear    Glucose Urine Negative Negative mg/dL    Bilirubin Urine Negative Negative    Ketones Urine Negative Negative mg/dL    Specific Gravity Urine >=1.030 1.003 - 1.035    Blood Urine Small (A) Negative    pH Urine 5.5 5.0 - 7.0    Protein Albumin Urine Negative Negative mg/dL    Urobilinogen Urine 0.2 0.2, 1.0 E.U./dL    Nitrite Urine Negative Negative    Leukocyte Esterase Urine Negative Negative   UA Microscopic with Reflex to Culture     Status: Abnormal   Result Value Ref Range    Bacteria Urine None Seen None Seen /HPF    RBC Urine 2-5 (A) 0-2 /HPF /HPF    WBC Urine 0-5 0-5 /HPF /HPF    Squamous Epithelials Urine Moderate (A) None Seen /LPF    Narrative     Urine Culture not indicated   Wet prep - lab collect     Status: Abnormal    Specimen: Vagina; Swab   Result Value Ref Range    Trichomonas Absent Absent    Yeast Absent Absent    Clue Cells Present (A) Absent    WBCs/high power field 1+ (A) None

## 2024-01-31 ENCOUNTER — HOSPITAL ENCOUNTER (EMERGENCY)
Facility: CLINIC | Age: 24
Discharge: HOME | End: 2024-01-31
Attending: EMERGENCY MEDICINE | Admitting: EMERGENCY MEDICINE
Payer: COMMERCIAL

## 2024-01-31 ENCOUNTER — APPOINTMENT (OUTPATIENT)
Dept: CT IMAGING | Facility: CLINIC | Age: 24
End: 2024-01-31
Attending: EMERGENCY MEDICINE
Payer: COMMERCIAL

## 2024-01-31 VITALS
HEART RATE: 66 BPM | SYSTOLIC BLOOD PRESSURE: 107 MMHG | TEMPERATURE: 97.8 F | WEIGHT: 149 LBS | RESPIRATION RATE: 18 BRPM | DIASTOLIC BLOOD PRESSURE: 80 MMHG | HEIGHT: 60 IN | BODY MASS INDEX: 29.25 KG/M2 | OXYGEN SATURATION: 96 %

## 2024-01-31 DIAGNOSIS — R10.31 RIGHT LOWER QUADRANT ABDOMINAL PAIN: ICD-10-CM

## 2024-01-31 DIAGNOSIS — R11.2 NAUSEA AND VOMITING, UNSPECIFIED VOMITING TYPE: ICD-10-CM

## 2024-01-31 LAB
ALBUMIN SERPL BCG-MCNC: 4.5 G/DL (ref 3.5–5.2)
ALBUMIN UR-MCNC: NEGATIVE MG/DL
ALP SERPL-CCNC: 73 U/L (ref 40–150)
ALT SERPL W P-5'-P-CCNC: 17 U/L (ref 0–50)
ANION GAP SERPL CALCULATED.3IONS-SCNC: 8 MMOL/L (ref 7–15)
APPEARANCE UR: CLEAR
AST SERPL W P-5'-P-CCNC: 17 U/L (ref 0–45)
BASOPHILS # BLD AUTO: 0.1 10E3/UL (ref 0–0.2)
BASOPHILS NFR BLD AUTO: 1 %
BILIRUB SERPL-MCNC: 0.3 MG/DL
BILIRUB UR QL STRIP: NEGATIVE
BUN SERPL-MCNC: 7.8 MG/DL (ref 6–20)
CALCIUM SERPL-MCNC: 9.5 MG/DL (ref 8.6–10)
CHLORIDE SERPL-SCNC: 103 MMOL/L (ref 98–107)
COLOR UR AUTO: ABNORMAL
CREAT SERPL-MCNC: 0.7 MG/DL (ref 0.51–0.95)
DEPRECATED HCO3 PLAS-SCNC: 27 MMOL/L (ref 22–29)
EGFRCR SERPLBLD CKD-EPI 2021: >90 ML/MIN/1.73M2
EOSINOPHIL # BLD AUTO: 0.1 10E3/UL (ref 0–0.7)
EOSINOPHIL NFR BLD AUTO: 2 %
ERYTHROCYTE [DISTWIDTH] IN BLOOD BY AUTOMATED COUNT: 13.1 % (ref 10–15)
GLUCOSE SERPL-MCNC: 84 MG/DL (ref 70–99)
GLUCOSE UR STRIP-MCNC: NEGATIVE MG/DL
HCG SERPL QL: NEGATIVE
HCT VFR BLD AUTO: 39.6 % (ref 35–47)
HGB BLD-MCNC: 12.8 G/DL (ref 11.7–15.7)
HGB UR QL STRIP: NEGATIVE
IMM GRANULOCYTES # BLD: 0 10E3/UL
IMM GRANULOCYTES NFR BLD: 0 %
KETONES UR STRIP-MCNC: NEGATIVE MG/DL
LEUKOCYTE ESTERASE UR QL STRIP: NEGATIVE
LYMPHOCYTES # BLD AUTO: 2 10E3/UL (ref 0.8–5.3)
LYMPHOCYTES NFR BLD AUTO: 29 %
MCH RBC QN AUTO: 27.6 PG (ref 26.5–33)
MCHC RBC AUTO-ENTMCNC: 32.3 G/DL (ref 31.5–36.5)
MCV RBC AUTO: 85 FL (ref 78–100)
MONOCYTES # BLD AUTO: 0.4 10E3/UL (ref 0–1.3)
MONOCYTES NFR BLD AUTO: 6 %
MUCOUS THREADS #/AREA URNS LPF: PRESENT /LPF
NEUTROPHILS # BLD AUTO: 4.3 10E3/UL (ref 1.6–8.3)
NEUTROPHILS NFR BLD AUTO: 62 %
NITRATE UR QL: NEGATIVE
NRBC # BLD AUTO: 0 10E3/UL
NRBC BLD AUTO-RTO: 0 /100
PH UR STRIP: 7.5 [PH] (ref 5–7)
PLATELET # BLD AUTO: 413 10E3/UL (ref 150–450)
POTASSIUM SERPL-SCNC: 4.1 MMOL/L (ref 3.4–5.3)
PROT SERPL-MCNC: 7.5 G/DL (ref 6.4–8.3)
RBC # BLD AUTO: 4.64 10E6/UL (ref 3.8–5.2)
RBC URINE: 1 /HPF
SODIUM SERPL-SCNC: 138 MMOL/L (ref 135–145)
SP GR UR STRIP: 1.02 (ref 1–1.03)
SQUAMOUS EPITHELIAL: 1 /HPF
UROBILINOGEN UR STRIP-MCNC: NORMAL MG/DL
WBC # BLD AUTO: 6.9 10E3/UL (ref 4–11)
WBC URINE: 1 /HPF

## 2024-01-31 PROCEDURE — 85025 COMPLETE CBC W/AUTO DIFF WBC: CPT | Performed by: EMERGENCY MEDICINE

## 2024-01-31 PROCEDURE — 96361 HYDRATE IV INFUSION ADD-ON: CPT

## 2024-01-31 PROCEDURE — 96375 TX/PRO/DX INJ NEW DRUG ADDON: CPT

## 2024-01-31 PROCEDURE — 82040 ASSAY OF SERUM ALBUMIN: CPT | Performed by: EMERGENCY MEDICINE

## 2024-01-31 PROCEDURE — 250N000011 HC RX IP 250 OP 636: Performed by: EMERGENCY MEDICINE

## 2024-01-31 PROCEDURE — 258N000003 HC RX IP 258 OP 636: Performed by: EMERGENCY MEDICINE

## 2024-01-31 PROCEDURE — 36415 COLL VENOUS BLD VENIPUNCTURE: CPT | Performed by: EMERGENCY MEDICINE

## 2024-01-31 PROCEDURE — 74177 CT ABD & PELVIS W/CONTRAST: CPT

## 2024-01-31 PROCEDURE — 96374 THER/PROPH/DIAG INJ IV PUSH: CPT | Mod: 59

## 2024-01-31 PROCEDURE — 96376 TX/PRO/DX INJ SAME DRUG ADON: CPT

## 2024-01-31 PROCEDURE — 250N000009 HC RX 250: Performed by: EMERGENCY MEDICINE

## 2024-01-31 PROCEDURE — 84703 CHORIONIC GONADOTROPIN ASSAY: CPT | Performed by: EMERGENCY MEDICINE

## 2024-01-31 PROCEDURE — 99285 EMERGENCY DEPT VISIT HI MDM: CPT | Mod: 25

## 2024-01-31 PROCEDURE — 80053 COMPREHEN METABOLIC PANEL: CPT | Performed by: EMERGENCY MEDICINE

## 2024-01-31 PROCEDURE — 81001 URINALYSIS AUTO W/SCOPE: CPT | Performed by: EMERGENCY MEDICINE

## 2024-01-31 RX ORDER — KETOROLAC TROMETHAMINE 15 MG/ML
15 INJECTION, SOLUTION INTRAMUSCULAR; INTRAVENOUS ONCE
Status: COMPLETED | OUTPATIENT
Start: 2024-01-31 | End: 2024-01-31

## 2024-01-31 RX ORDER — IOPAMIDOL 755 MG/ML
75 INJECTION, SOLUTION INTRAVASCULAR ONCE
Status: COMPLETED | OUTPATIENT
Start: 2024-01-31 | End: 2024-01-31

## 2024-01-31 RX ORDER — ONDANSETRON 2 MG/ML
4 INJECTION INTRAMUSCULAR; INTRAVENOUS
Status: COMPLETED | OUTPATIENT
Start: 2024-01-31 | End: 2024-01-31

## 2024-01-31 RX ORDER — ONDANSETRON 2 MG/ML
4 INJECTION INTRAMUSCULAR; INTRAVENOUS ONCE
Status: COMPLETED | OUTPATIENT
Start: 2024-01-31 | End: 2024-01-31

## 2024-01-31 RX ADMIN — ONDANSETRON 4 MG: 2 INJECTION INTRAMUSCULAR; INTRAVENOUS at 10:57

## 2024-01-31 RX ADMIN — SODIUM CHLORIDE 1000 ML: 9 INJECTION, SOLUTION INTRAVENOUS at 10:57

## 2024-01-31 RX ADMIN — ONDANSETRON 4 MG: 2 INJECTION INTRAMUSCULAR; INTRAVENOUS at 12:19

## 2024-01-31 RX ADMIN — SODIUM CHLORIDE 59 ML: 9 INJECTION, SOLUTION INTRAVENOUS at 13:43

## 2024-01-31 RX ADMIN — KETOROLAC TROMETHAMINE 15 MG: 15 INJECTION, SOLUTION INTRAMUSCULAR; INTRAVENOUS at 12:22

## 2024-01-31 RX ADMIN — IOPAMIDOL 75 ML: 755 INJECTION, SOLUTION INTRAVENOUS at 13:43

## 2024-01-31 ASSESSMENT — ACTIVITIES OF DAILY LIVING (ADL): ADLS_ACUITY_SCORE: 33

## 2024-01-31 NOTE — ED PROVIDER NOTES
History     Chief Complaint:  Abdominal Pain       HPI   Rosanne Raines is a 23 year old female who presents with nausea and vomiting followed by right lower quadrant abdominal pain since yesterday. Her abdominal pain is constant and worse with turning. Endorses fever of 101 and chills. She has taken tylenol with minimal relief. Denies urinary symptoms. Notes  sections and denies concern for pregnancy. No drug allergies. Notes that she restarted taking wegovy at a high dose yesterday after being off it for several months.     Independent Historian:   None - Patient Only    Review of External Notes:   None       Medications:    Reglan   Atarax   Buspar  Prozac  Nystatin     Past Medical History:    Depression  PTSD  AGUSTIN    Past Surgical History:    IUD placed   IUD removed      Physical Exam   Patient Vitals for the past 24 hrs:   BP Temp Temp src Pulse Resp SpO2 Height Weight   24 1046 107/80 97.8  F (36.6  C) Oral 66 18 96 % 1.524 m (5') 67.6 kg (149 lb)        Physical Exam  Vitals and nursing note reviewed.   Constitutional:       General: She is not in acute distress.     Appearance: She is not ill-appearing.   HENT:      Head: Normocephalic and atraumatic.      Right Ear: External ear normal.      Left Ear: External ear normal.      Nose: Nose normal.   Eyes:      Extraocular Movements: Extraocular movements intact.      Conjunctiva/sclera: Conjunctivae normal.   Cardiovascular:      Rate and Rhythm: Normal rate and regular rhythm.      Heart sounds: No murmur heard.  Pulmonary:      Effort: Pulmonary effort is normal. No respiratory distress.      Breath sounds: Normal breath sounds. No wheezing, rhonchi or rales.   Abdominal:      General: Abdomen is flat. Bowel sounds are normal. There is no distension.      Palpations: Abdomen is soft.      Tenderness: There is abdominal tenderness in the right lower quadrant. There is guarding (in RLQ). There is no rebound.   Musculoskeletal:          General: No deformity or signs of injury.      Cervical back: Normal range of motion and neck supple.   Skin:     General: Skin is warm and dry.      Findings: No rash.   Neurological:      Mental Status: She is alert and oriented to person, place, and time.   Psychiatric:         Behavior: Behavior normal.           Emergency Department Course   Imaging:  CT Abdomen Pelvis w Contrast   Final Result   IMPRESSION:    1.  Normal appendix. No abnormality to explain the patient's symptoms.      NEGRO ADDISON MD            SYSTEM ID:  [id number]             Laboratory:  Labs Ordered and Resulted from Time of ED Arrival to Time of ED Departure   ROUTINE UA WITH MICROSCOPIC REFLEX TO CULTURE - Abnormal       Result Value    Color Urine Light Yellow      Appearance Urine Clear      Glucose Urine Negative      Bilirubin Urine Negative      Ketones Urine Negative      Specific Gravity Urine 1.020      Blood Urine Negative      pH Urine 7.5 (*)     Protein Albumin Urine Negative      Urobilinogen Urine Normal      Nitrite Urine Negative      Leukocyte Esterase Urine Negative      Mucus Urine Present (*)     RBC Urine 1      WBC Urine 1      Squamous Epithelials Urine 1     COMPREHENSIVE METABOLIC PANEL - Normal    Sodium 138      Potassium 4.1      Carbon Dioxide (CO2) 27      Anion Gap 8      Urea Nitrogen 7.8      Creatinine 0.70      GFR Estimate >90      Calcium 9.5      Chloride 103      Glucose 84      Alkaline Phosphatase 73      AST 17      ALT 17      Protein Total 7.5      Albumin 4.5      Bilirubin Total 0.3     HCG QUALITATIVE PREGNANCY - Normal    hCG Serum Qualitative Negative     CBC WITH PLATELETS AND DIFFERENTIAL    WBC Count 6.9      RBC Count 4.64      Hemoglobin 12.8      Hematocrit 39.6      MCV 85      MCH 27.6      MCHC 32.3      RDW 13.1      Platelet Count 413      % Neutrophils 62      % Lymphocytes 29      % Monocytes 6      % Eosinophils 2      % Basophils 1      % Immature Granulocytes 0       NRBCs per 100 WBC 0      Absolute Neutrophils 4.3      Absolute Lymphocytes 2.0      Absolute Monocytes 0.4      Absolute Eosinophils 0.1      Absolute Basophils 0.1      Absolute Immature Granulocytes 0.0      Absolute NRBCs 0.0          Procedures   None     Emergency Department Course & Assessments:  Interventions:  Medications   sodium chloride 0.9% BOLUS 1,000 mL (0 mLs Intravenous Stopped 24 1215)   ondansetron (ZOFRAN) injection 4 mg (4 mg Intravenous $Given 24 1057)   ketorolac (TORADOL) injection 15 mg (15 mg Intravenous $Given 24 1222)   ondansetron (ZOFRAN) injection 4 mg (4 mg Intravenous $Given 24 1219)   iopamidol (ISOVUE-370) solution 75 mL (75 mLs Intravenous $Given 24 1343)   sodium chloride 0.9 % bag 500mL for CT scan flush use (59 mLs Intravenous $Given 24 1343)        Assessments:  1144 I obtained history and examined the patient as noted above.     Independent Interpretation (X-rays, CTs, rhythm strip):  None    Consultations/Discussion of Management or Tests:  None        Social Determinants of Health affecting care:   None    Disposition:  The patient was discharged.     Impression & Plan    Medical Decision Makin-year-old female presenting with abdominal pain and vomiting.  I suspect that this is a viral illness or possibly a side effect of restarting Wegovy.  However she is surprisingly tender in the right lower quadrant with guarding so we will pursue appendicitis workup.  Her lab work is unremarkable we will obtain a CT for further evaluation.    1427 patient eloped from the ED prior to receiving her results.  Her CT was unremarkable so I think this is likely a viral illness.    Diagnosis:    ICD-10-CM    1. Right lower quadrant abdominal pain  R10.31       2. Nausea and vomiting, unspecified vomiting type  R11.2            Discharge Medications:  Discharge Medication List as of 2024  2:27 PM             Scribe Disclosure:  Kaushik PONCE am  serving as a scribe at 11:50 AM on 1/31/2024 to document services personally performed by Edwin Lopez MD based on my observations and the provider's statements to me.   1/31/2024   Edwin Lopez MD Goodwin, Shaun M, MD  01/31/24 1852

## 2024-01-31 NOTE — ED TRIAGE NOTES
Pt here with c/o RLQ abdominal pain with N/V that started 1 day ago. Endorses fevers at home. Tylenol about 1 hour ago with no relief.

## 2024-03-10 ENCOUNTER — HEALTH MAINTENANCE LETTER (OUTPATIENT)
Age: 24
End: 2024-03-10

## 2024-09-09 ENCOUNTER — APPOINTMENT (OUTPATIENT)
Dept: CT IMAGING | Facility: CLINIC | Age: 24
End: 2024-09-09
Attending: EMERGENCY MEDICINE
Payer: COMMERCIAL

## 2024-09-09 ENCOUNTER — HOSPITAL ENCOUNTER (EMERGENCY)
Facility: CLINIC | Age: 24
Discharge: HOME | End: 2024-09-10
Attending: EMERGENCY MEDICINE | Admitting: EMERGENCY MEDICINE
Payer: COMMERCIAL

## 2024-09-09 DIAGNOSIS — R10.2 PELVIC PAIN IN FEMALE: ICD-10-CM

## 2024-09-09 DIAGNOSIS — N93.9 ABNORMAL UTERINE BLEEDING: ICD-10-CM

## 2024-09-09 LAB
ABO/RH(D): NORMAL
ALBUMIN SERPL BCG-MCNC: 4.5 G/DL (ref 3.5–5.2)
ALP SERPL-CCNC: 65 U/L (ref 40–150)
ALT SERPL W P-5'-P-CCNC: 32 U/L (ref 0–50)
ANION GAP SERPL CALCULATED.3IONS-SCNC: 12 MMOL/L (ref 7–15)
ANTIBODY SCREEN: NEGATIVE
AST SERPL W P-5'-P-CCNC: 25 U/L (ref 0–45)
BASOPHILS # BLD AUTO: 0 10E3/UL (ref 0–0.2)
BASOPHILS NFR BLD AUTO: 1 %
BILIRUB DIRECT SERPL-MCNC: <0.2 MG/DL (ref 0–0.3)
BILIRUB SERPL-MCNC: 0.2 MG/DL
BUN SERPL-MCNC: 11.8 MG/DL (ref 6–20)
CALCIUM SERPL-MCNC: 9.6 MG/DL (ref 8.8–10.4)
CHLORIDE SERPL-SCNC: 101 MMOL/L (ref 98–107)
CREAT SERPL-MCNC: 0.67 MG/DL (ref 0.51–0.95)
EGFRCR SERPLBLD CKD-EPI 2021: >90 ML/MIN/1.73M2
EOSINOPHIL # BLD AUTO: 0.3 10E3/UL (ref 0–0.7)
EOSINOPHIL NFR BLD AUTO: 5 %
ERYTHROCYTE [DISTWIDTH] IN BLOOD BY AUTOMATED COUNT: 12.2 % (ref 10–15)
GLUCOSE SERPL-MCNC: 74 MG/DL (ref 70–99)
HCO3 SERPL-SCNC: 26 MMOL/L (ref 22–29)
HCT VFR BLD AUTO: 41.8 % (ref 35–47)
HGB BLD-MCNC: 13.3 G/DL (ref 11.7–15.7)
HOLD SPECIMEN: NORMAL
IMM GRANULOCYTES # BLD: 0 10E3/UL
IMM GRANULOCYTES NFR BLD: 0 %
LYMPHOCYTES # BLD AUTO: 2.9 10E3/UL (ref 0.8–5.3)
LYMPHOCYTES NFR BLD AUTO: 48 %
MCH RBC QN AUTO: 27.5 PG (ref 26.5–33)
MCHC RBC AUTO-ENTMCNC: 31.8 G/DL (ref 31.5–36.5)
MCV RBC AUTO: 87 FL (ref 78–100)
MONOCYTES # BLD AUTO: 0.5 10E3/UL (ref 0–1.3)
MONOCYTES NFR BLD AUTO: 8 %
NEUTROPHILS # BLD AUTO: 2.3 10E3/UL (ref 1.6–8.3)
NEUTROPHILS NFR BLD AUTO: 38 %
NRBC # BLD AUTO: 0 10E3/UL
NRBC BLD AUTO-RTO: 0 /100
PLATELET # BLD AUTO: 404 10E3/UL (ref 150–450)
POTASSIUM SERPL-SCNC: 3.7 MMOL/L (ref 3.4–5.3)
PROT SERPL-MCNC: 7.6 G/DL (ref 6.4–8.3)
RBC # BLD AUTO: 4.83 10E6/UL (ref 3.8–5.2)
SODIUM SERPL-SCNC: 139 MMOL/L (ref 135–145)
SPECIMEN EXPIRATION DATE: NORMAL
WBC # BLD AUTO: 6.1 10E3/UL (ref 4–11)

## 2024-09-09 PROCEDURE — 85025 COMPLETE CBC W/AUTO DIFF WBC: CPT | Performed by: EMERGENCY MEDICINE

## 2024-09-09 PROCEDURE — 86900 BLOOD TYPING SEROLOGIC ABO: CPT | Performed by: EMERGENCY MEDICINE

## 2024-09-09 PROCEDURE — 250N000011 HC RX IP 250 OP 636: Performed by: EMERGENCY MEDICINE

## 2024-09-09 PROCEDURE — 80053 COMPREHEN METABOLIC PANEL: CPT | Performed by: EMERGENCY MEDICINE

## 2024-09-09 PROCEDURE — 96375 TX/PRO/DX INJ NEW DRUG ADDON: CPT

## 2024-09-09 PROCEDURE — 74177 CT ABD & PELVIS W/CONTRAST: CPT

## 2024-09-09 PROCEDURE — 82040 ASSAY OF SERUM ALBUMIN: CPT | Performed by: EMERGENCY MEDICINE

## 2024-09-09 PROCEDURE — 99285 EMERGENCY DEPT VISIT HI MDM: CPT | Mod: 25

## 2024-09-09 PROCEDURE — 250N000009 HC RX 250: Performed by: EMERGENCY MEDICINE

## 2024-09-09 PROCEDURE — 96376 TX/PRO/DX INJ SAME DRUG ADON: CPT

## 2024-09-09 PROCEDURE — 80048 BASIC METABOLIC PNL TOTAL CA: CPT | Performed by: EMERGENCY MEDICINE

## 2024-09-09 PROCEDURE — 96374 THER/PROPH/DIAG INJ IV PUSH: CPT

## 2024-09-09 PROCEDURE — 36415 COLL VENOUS BLD VENIPUNCTURE: CPT | Performed by: EMERGENCY MEDICINE

## 2024-09-09 PROCEDURE — 250N000013 HC RX MED GY IP 250 OP 250 PS 637: Performed by: EMERGENCY MEDICINE

## 2024-09-09 RX ORDER — HYDROMORPHONE HYDROCHLORIDE 1 MG/ML
0.5 INJECTION, SOLUTION INTRAMUSCULAR; INTRAVENOUS; SUBCUTANEOUS
Status: COMPLETED | OUTPATIENT
Start: 2024-09-09 | End: 2024-09-09

## 2024-09-09 RX ORDER — ONDANSETRON 4 MG/1
4 TABLET, ORALLY DISINTEGRATING ORAL ONCE
Status: DISCONTINUED | OUTPATIENT
Start: 2024-09-09 | End: 2024-09-09

## 2024-09-09 RX ORDER — ONDANSETRON 2 MG/ML
4 INJECTION INTRAMUSCULAR; INTRAVENOUS ONCE
Status: COMPLETED | OUTPATIENT
Start: 2024-09-09 | End: 2024-09-09

## 2024-09-09 RX ORDER — IOPAMIDOL 755 MG/ML
500 INJECTION, SOLUTION INTRAVASCULAR ONCE
Status: COMPLETED | OUTPATIENT
Start: 2024-09-09 | End: 2024-09-09

## 2024-09-09 RX ORDER — OXYCODONE HYDROCHLORIDE 5 MG/1
5 TABLET ORAL ONCE
Status: COMPLETED | OUTPATIENT
Start: 2024-09-09 | End: 2024-09-09

## 2024-09-09 RX ORDER — KETOROLAC TROMETHAMINE 15 MG/ML
15 INJECTION, SOLUTION INTRAMUSCULAR; INTRAVENOUS ONCE
Status: COMPLETED | OUTPATIENT
Start: 2024-09-09 | End: 2024-09-09

## 2024-09-09 RX ORDER — MORPHINE SULFATE 4 MG/ML
4 INJECTION, SOLUTION INTRAMUSCULAR; INTRAVENOUS ONCE
Status: COMPLETED | OUTPATIENT
Start: 2024-09-09 | End: 2024-09-09

## 2024-09-09 RX ADMIN — SODIUM CHLORIDE 59 ML: 9 INJECTION, SOLUTION INTRAVENOUS at 21:48

## 2024-09-09 RX ADMIN — ONDANSETRON 4 MG: 2 INJECTION INTRAMUSCULAR; INTRAVENOUS at 19:26

## 2024-09-09 RX ADMIN — IOPAMIDOL 75 ML: 755 INJECTION, SOLUTION INTRAVENOUS at 21:48

## 2024-09-09 RX ADMIN — HYDROMORPHONE HYDROCHLORIDE 0.5 MG: 1 INJECTION, SOLUTION INTRAMUSCULAR; INTRAVENOUS; SUBCUTANEOUS at 20:56

## 2024-09-09 RX ADMIN — MORPHINE SULFATE 4 MG: 4 INJECTION, SOLUTION INTRAMUSCULAR; INTRAVENOUS at 19:53

## 2024-09-09 RX ADMIN — KETOROLAC TROMETHAMINE 15 MG: 15 INJECTION, SOLUTION INTRAMUSCULAR; INTRAVENOUS at 20:38

## 2024-09-09 RX ADMIN — OXYCODONE HYDROCHLORIDE 5 MG: 5 TABLET ORAL at 19:23

## 2024-09-09 RX ADMIN — ONDANSETRON 4 MG: 2 INJECTION INTRAMUSCULAR; INTRAVENOUS at 23:48

## 2024-09-09 ASSESSMENT — COLUMBIA-SUICIDE SEVERITY RATING SCALE - C-SSRS
2. HAVE YOU ACTUALLY HAD ANY THOUGHTS OF KILLING YOURSELF IN THE PAST MONTH?: NO
6. HAVE YOU EVER DONE ANYTHING, STARTED TO DO ANYTHING, OR PREPARED TO DO ANYTHING TO END YOUR LIFE?: NO
1. IN THE PAST MONTH, HAVE YOU WISHED YOU WERE DEAD OR WISHED YOU COULD GO TO SLEEP AND NOT WAKE UP?: NO

## 2024-09-09 ASSESSMENT — ACTIVITIES OF DAILY LIVING (ADL)
ADLS_ACUITY_SCORE: 35

## 2024-09-10 VITALS
TEMPERATURE: 98.8 F | DIASTOLIC BLOOD PRESSURE: 72 MMHG | HEART RATE: 62 BPM | SYSTOLIC BLOOD PRESSURE: 102 MMHG | OXYGEN SATURATION: 98 % | RESPIRATION RATE: 18 BRPM

## 2024-09-10 RX ORDER — OXYCODONE HYDROCHLORIDE 5 MG/1
5 TABLET ORAL EVERY 6 HOURS PRN
Qty: 6 TABLET | Refills: 0 | Status: SHIPPED | OUTPATIENT
Start: 2024-09-10 | End: 2024-09-13

## 2024-09-10 RX ORDER — ONDANSETRON 4 MG/1
4 TABLET, ORALLY DISINTEGRATING ORAL EVERY 8 HOURS PRN
Qty: 10 TABLET | Refills: 0 | Status: SHIPPED | OUTPATIENT
Start: 2024-09-10 | End: 2024-09-10

## 2024-09-10 RX ORDER — OXYCODONE HYDROCHLORIDE 5 MG/1
5 TABLET ORAL EVERY 6 HOURS PRN
Qty: 10 TABLET | Refills: 0 | Status: SHIPPED | OUTPATIENT
Start: 2024-09-10 | End: 2024-09-10

## 2024-09-10 RX ORDER — NORETHINDRONE ACETATE AND ETHINYL ESTRADIOL .03; 1.5 MG/1; MG/1
TABLET ORAL
Qty: 50 TABLET | Refills: 0 | Status: SHIPPED | OUTPATIENT
Start: 2024-09-10 | End: 2024-10-08

## 2024-09-10 RX ORDER — ONDANSETRON 4 MG/1
4 TABLET, ORALLY DISINTEGRATING ORAL EVERY 8 HOURS PRN
Qty: 10 TABLET | Refills: 0 | Status: SHIPPED | OUTPATIENT
Start: 2024-09-10 | End: 2024-09-13

## 2024-09-10 NOTE — ED PROVIDER NOTES
Emergency Department Note      Code Status: No Order    History of Present Illness     Chief Complaint:  Abdominal Pain and Vaginal Bleeding       BESSIE Raines is a 24 year old female who presents for evaluation of abdominal pain and vaginal bleeding. Patient reports that this morning she began experiencing heavy vaginal bleeding though she has not had a period in 6 years due to her IUD. She states that she has to change her tampon every 1.5 hours and has even been bleeding through her pants with this. Of note, recent ultrasound showed a mass on her uterus. She adds that she has been covid positive since 9/4 with no symptoms. She now endorses significant abdominal pain as of today. Patient has no history of issues with her IUD. She denies any abdominal discomfort or cramping prior to this morning.       Independent Historian:    None    Review of External Notes  [facility] note 8/10/24: URI symptoms x 1 day. COVID/Strep, Trop, Dimer negative. DC with symptomatic treatment.  [facility] note 8/14/24: URI symptoms. 102 F temp 8/13/24. COVID and flu negative. Strep positive. Amoxicillin prescribed.  [facility] note 9/6/24: Fever & nausea. Positive home COVID test 9/4/24. Positive Urgent Care confirmation COVID test.  [facility] note 9/9/24 (today):   Lower abdominal cramping/vaginal bleeding. Changing tampon every 1 to 1.5 hours. Concerned about IUD dislodgement as she had intercourse 9/8/24 with some discomfort.   US showed normal placement of IUD as well as heterogenous appearance of the myometrium, which is nonspecific but can be seen with diffuse adenomyosis. No pelvic free fluid.    Past Medical History   Medical History, Surgical History, Problem List, and Medications  Reviewed in Epic    Physical Exam   Patient Vitals for the past 24 hrs:   BP Temp Temp src Pulse Resp SpO2   09/10/24 0034 -- 98.8  F (37.1  C) Oral -- -- 98 %   09/10/24 0032 102/72 -- -- 62 -- --   09/09/24 1951 -- -- --  -- -- 98 %   09/09/24 1936 -- -- -- -- -- 99 %   09/09/24 1921 -- -- -- -- -- 98 %   09/09/24 1915 133/85 -- -- -- -- --   09/09/24 1908 (!) 125/98 97.9  F (36.6  C) -- -- 18 --   09/09/24 1907 -- -- -- 76 -- 99 %       Physical Exam  Constitutional: Vital signs reviewed as above.   Eyes: PEERL, EOMI B/L  Neck: No JVD noted. FROM   Cardiovascular: normal rate, Regular rhythm and normal heart sounds.  No murmur heard. Equal B/L peripheral pulses.  Pulmonary/Chest: Effort normal and breath sounds normal. No respiratory distress. Patient has no wheezes. Patient has no rales.   Gastrointestinal: Soft. There is B/L LQ tenderness.   : Normal external female genitalia. No bleeding noted at the vaginal introitus.   Musculoskeletal/Extremities: No pitting edema noted. Normal tone.  Skin: Skin is warm and dry. There is no diaphoresis noted.   Psychiatric: The patient appears calm.     Diagnostics     Laboratory: Imaging:   Labs Ordered and Resulted from Time of ED Arrival to Time of ED Departure   BASIC METABOLIC PANEL - Normal       Result Value    Sodium 139      Potassium 3.7      Chloride 101      Carbon Dioxide (CO2) 26      Anion Gap 12      Urea Nitrogen 11.8      Creatinine 0.67      GFR Estimate >90      Calcium 9.6      Glucose 74     HEPATIC FUNCTION PANEL - Normal    Protein Total 7.6      Albumin 4.5      Bilirubin Total 0.2      Alkaline Phosphatase 65      AST 25      ALT 32      Bilirubin Direct <0.20     CBC WITH PLATELETS AND DIFFERENTIAL    WBC Count 6.1      RBC Count 4.83      Hemoglobin 13.3      Hematocrit 41.8      MCV 87      MCH 27.5      MCHC 31.8      RDW 12.2      Platelet Count 404      % Neutrophils 38      % Lymphocytes 48      % Monocytes 8      % Eosinophils 5      % Basophils 1      % Immature Granulocytes 0      NRBCs per 100 WBC 0      Absolute Neutrophils 2.3      Absolute Lymphocytes 2.9      Absolute Monocytes 0.5      Absolute Eosinophils 0.3      Absolute Basophils 0.0      Absolute  Immature Granulocytes 0.0      Absolute NRBCs 0.0     TYPE AND SCREEN, ADULT    ABO/RH(D) O POS      Antibody Screen Negative      SPECIMEN EXPIRATION DATE [id number]     ABO/RH TYPE AND SCREEN     CT Abdomen Pelvis w Contrast   Final Result   IMPRESSION:    1.  Sigmoid diverticula, without evidence for diverticulitis.   2.  IUD.   3.  No findings to account for the patient's lower abdominal pain.              Independent Interpretation  See ED course    ED Course    Medications Administered  Medications   oxyCODONE (ROXICODONE) tablet 5 mg (5 mg Oral $Given 9/9/24 1923)   ondansetron (ZOFRAN) injection 4 mg (4 mg Intravenous $Given 9/9/24 1926)   morphine (PF) injection 4 mg (4 mg Intravenous $Given 9/9/24 1953)   ketorolac (TORADOL) injection 15 mg (15 mg Intravenous $Given 9/9/24 2038)   HYDROmorphone (PF) (DILAUDID) injection 0.5 mg (0.5 mg Intravenous $Given 9/9/24 2056)   iopamidol (ISOVUE-370) solution 500 mL (75 mLs Intravenous $Given 9/9/24 2148)   CT scan flush (59 mLs Intravenous $Given 9/9/24 2148)   ondansetron (ZOFRAN) injection 4 mg (4 mg Intravenous $Given 9/9/24 2348)       Procedures  Procedures     Discussion of Management  See ED Course    ED Course  ED Course as of 09/10/24 0117   Mon Sep 09, 2024   1934 Initial evaluation   2052 Rechecked and updated.   2058 External pelvic exam with nurse. No gross bleeding on pad. Patient in too much pain to tolerate speculum exam.   2327 Rechecked and updated. Patient notes nausea. Zofran ordered.   2353 Proceeded to try Speculum exam with nurse. No external bleeding noted. Patient too apprehensive about pain to proceed with speculum exam.   Tue Sep 10, 2024   0014 D/W Dr. Woods. Can do OCP tapering.       Optional/Additional Documentation: None    Medical Decision Making / Diagnosis     MIPS     None    Medical Decision Making:  This 24-year-old presents from urgent care due to vaginal bleeding and abdominal/pelvic pain.  Please see the HPI and exam  for specifics.  A broad differential was considered.  Laboratory studies were ordered though these are normal which was interesting given the patient's description of significant vaginal bleeding.  She also had very difficult to control pain.  Due to the ongoing discomfort, I ordered CT imaging to ensure there was not an additional intra-abdominal process that was being missed.  The patient had 2 prior C-sections and while she did not seem to examine like somebody with a bowel obstruction that or appendicitis or diverticulitis were on the differential.  Fortunately, CT imaging was reassuring.    In follow-up, once her pain was better controlled, we attempted to do a speculum exam.  The patient was very concerned about discomfort from this as she had quite a bit of pain with the transvaginal ultrasound at urgent care.  Ultimately, we did not go through with a speculum exam though at the time of that evaluation (which was separate from the initial external chaperoned genitourinary exam) there was still no vaginal bleeding noted.    I discussed the case with OB.  They recommended a tapering birth control pack and follow-up with OB/GYN in the outpatient setting.  I placed an OB/GYN  follow-up order and the patient was discharged in stable condition    Critical Care:  None.    Disposition:  See ED Course and MDM    ICD-10 Codes:    ICD-10-CM    1. Abnormal uterine bleeding  N93.9 Ob/Gyn  Referral      2. Pelvic pain in female  R10.2            Discharge Medications:  Discharge Medication List as of 9/10/2024 12:41 AM        START taking these medications    Details   norethindrone-ethinyl estradiol (MICROGESTIN 1.5/30) 1.5-30 MG-MCG tablet Take 4 tablets by mouth daily for 4 days, THEN 3 tablets daily for 4 days, THEN 2 tablets daily for 2 days, THEN 1 tablet daily for 18 days., Disp-50 tablet, R-0, E-Prescribe      ondansetron (ZOFRAN ODT) 4 MG ODT tab Take 1 tablet (4 mg) by mouth every 8 hours as  needed for nausea or vomiting., Disp-10 tablet, R-0, E-Prescribe      oxyCODONE (ROXICODONE) 5 MG tablet Take 1 tablet (5 mg) by mouth every 6 hours as needed for severe pain., Disp-6 tablet, R-0, E-Prescribe              Scribe Disclosure:  I, Digna Bernal, am serving as a scribe at 7:35 PM on 9/9/2024 to document services personally performed by Paras Raines DO based on my observations and the provider's statements to me.    9/9/2024   Paras Raines DO     [employer]                    Paras Raines DO  09/10/24 0117       Paras Raines DO  09/10/24 0117

## 2024-09-10 NOTE — ED NOTES
Pt still having ++ pain at this time. Rates 9/10. Pt was crying and trembling while using the commode.

## 2024-09-10 NOTE — ED TRIAGE NOTES
Recent US, found mass on uterus. Vaginal bleeding began this morning. Significant abdominal pain. A&Ox4, tearful.

## 2024-09-10 NOTE — ED NOTES
Pt discharged home by writer. Pt received updated AVS from writer. PIV removed. Left amb with good gait to exit.

## 2024-09-10 NOTE — ED NOTES
Pt noted to have ++ yellow bruising to bilateral inner thighs. Pt asked by writer what happened, MD in room, pt states that she fell, writer asked if she was sure that she fell and pt states she does not know how the bruising came about.

## 2024-09-11 RX ORDER — TRANEXAMIC ACID 650 MG/1
1300 TABLET ORAL 3 TIMES DAILY
Qty: 30 TABLET | Refills: 0 | Status: SHIPPED | OUTPATIENT
Start: 2024-09-11 | End: 2024-09-16

## 2025-03-16 ENCOUNTER — HEALTH MAINTENANCE LETTER (OUTPATIENT)
Age: 25
End: 2025-03-16